# Patient Record
Sex: FEMALE | Race: WHITE | URBAN - NONMETROPOLITAN AREA
[De-identification: names, ages, dates, MRNs, and addresses within clinical notes are randomized per-mention and may not be internally consistent; named-entity substitution may affect disease eponyms.]

---

## 2021-02-16 ENCOUNTER — OFFICE VISIT (OUTPATIENT)
Dept: URBAN - NONMETROPOLITAN AREA CLINIC 13 | Facility: CLINIC | Age: 36
End: 2021-02-16
Payer: COMMERCIAL

## 2021-02-16 ENCOUNTER — DASHBOARD ENCOUNTERS (OUTPATIENT)
Age: 36
End: 2021-02-16

## 2021-02-16 DIAGNOSIS — R79.89 ELEVATED LFTS: ICD-10-CM

## 2021-02-16 PROCEDURE — 99244 OFF/OP CNSLTJ NEW/EST MOD 40: CPT | Performed by: INTERNAL MEDICINE

## 2021-02-16 NOTE — HPI-TODAY'S VISIT:
Lacie is a new patient kindly referred by Dr. luong.  A copy of this document is being forwarded to her.  She is here today for right-sided abdominal pain.  She has 2 young children and states since her pregnancy she has had pain under her right rib cage.  This is not associated with food.  It is not associated with fevers or chills.  She did say she had mono prior to her last pregnancy and her liver tests were elevated then.  She had ultrasound recently that showed mildly enlarged liver.  There was no liver masses or gallbladder related pathology.  Currently she feels well.  This pain is not a major bother to her but she does feel it routinely.  Again it is not associated with food.  She is not had any weight loss.  She denies any jaundice or itching at this time.